# Patient Record
Sex: MALE | Race: WHITE
[De-identification: names, ages, dates, MRNs, and addresses within clinical notes are randomized per-mention and may not be internally consistent; named-entity substitution may affect disease eponyms.]

---

## 2021-04-02 ENCOUNTER — HOSPITAL ENCOUNTER (OUTPATIENT)
Dept: HOSPITAL 53 - M RAD | Age: 31
End: 2021-04-02
Attending: ORTHOPAEDIC SURGERY
Payer: COMMERCIAL

## 2021-04-02 DIAGNOSIS — M54.5: Primary | ICD-10-CM

## 2021-04-02 NOTE — REP
INDICATION:

LBP.



COMPARISON:

None.



TECHNIQUE:

Multiple sequences obtained in the sagittal and axial planes.



FINDINGS:

Vertebral bodies are normal in height and well aligned with normal lumbar lordosis.

No abnormal bone marrow signal is seen.  Conus is unremarkable.  There is loss of

water signal and disc degeneration L4-5 and L5-S1 with very mild disc space narrowing

at those levels.



At the L1-2, L2-3 and L3-4 levels there is no significant disc bulging or herniation.

There is no spinal stenosis or neural foraminal narrowing.   There is a 1 cm cyst at

the posterior margin of the facet joint on the right at the L3-4 level.



At L4-5 there is a large diffuse posterior herniation of disc material causing severe

spinal stenosis and compression of the thecal sac.  There is significant crowding of

cauda equina and nerve roots at this level.  Mild facet hypertrophy is seen at this

level.  Neural foramina appear slightly narrowed.



At L5-S1 there is moderate left paracentral and foraminal broad based disc protrusion

with a suspected tear of the annulus.  There is not significant spinal canal

narrowing.  There is moderate left lateral recess and foraminal stenosis at this

level.  There are facet hypertrophic changes at this level.



IMPRESSION:

At L4-5 there is a large diffuse posterior herniation of disc material causing severe

spinal stenosis and compression of the thecal sac.  There is significant crowding of

cauda equina and nerve roots at this level.  Mild facet hypertrophy is seen at this

level.  Neural foramina appear slightly narrowed.



At L5-S1 there is moderate left paracentral and foraminal broad based disc protrusion

with a suspected tear of the annulus.  There is not significant spinal canal

narrowing.  There is moderate left lateral recess and foraminal stenosis at this

level.  There are facet hypertrophic changes at this level.



A preliminary report was provided for the referring clinician by [facility] at

the time of the exam.





<Electronically signed by Parvez Mendoza > 04/02/21 [id number]

## 2021-05-13 ENCOUNTER — HOSPITAL ENCOUNTER (OUTPATIENT)
Dept: HOSPITAL 53 - M WUC | Age: 31
End: 2021-05-13
Attending: PHYSICIAN ASSISTANT
Payer: COMMERCIAL

## 2021-05-13 DIAGNOSIS — Z01.818: Primary | ICD-10-CM

## 2021-05-13 NOTE — REP
INDICATION:

PRE OP TESTING



COMPARISON:

None.



TECHNIQUE:

PA and lateral.



FINDINGS:

The mediastinum and cardiac silhouette are normal.  The lung fields are clear and

without acute consolidation, effusion, or pneumothorax.  The skeletal structures are

intact and normal.



IMPRESSION:

No acute cardiopulmonary process.





<Electronically signed by Franko Harvey > 05/13/21 [id number]

## 2021-05-22 ENCOUNTER — HOSPITAL ENCOUNTER (OUTPATIENT)
Dept: HOSPITAL 53 - M LABSMTC | Age: 31
End: 2021-05-22
Attending: ORTHOPAEDIC SURGERY
Payer: COMMERCIAL

## 2021-05-22 DIAGNOSIS — Z01.812: Primary | ICD-10-CM

## 2021-05-22 DIAGNOSIS — Z11.52: ICD-10-CM
